# Patient Record
Sex: FEMALE | Race: BLACK OR AFRICAN AMERICAN | NOT HISPANIC OR LATINO | URBAN - METROPOLITAN AREA
[De-identification: names, ages, dates, MRNs, and addresses within clinical notes are randomized per-mention and may not be internally consistent; named-entity substitution may affect disease eponyms.]

---

## 2023-10-20 ENCOUNTER — APPOINTMENT (RX ONLY)
Dept: URBAN - METROPOLITAN AREA CLINIC 71 | Facility: CLINIC | Age: 53
Setting detail: DERMATOLOGY
End: 2023-10-20

## 2023-10-20 DIAGNOSIS — D22 MELANOCYTIC NEVI: ICD-10-CM

## 2023-10-20 DIAGNOSIS — L81.4 OTHER MELANIN HYPERPIGMENTATION: ICD-10-CM

## 2023-10-20 DIAGNOSIS — D485 NEOPLASM OF UNCERTAIN BEHAVIOR OF SKIN: ICD-10-CM

## 2023-10-20 PROBLEM — D22.61 MELANOCYTIC NEVI OF RIGHT UPPER LIMB, INCLUDING SHOULDER: Status: ACTIVE | Noted: 2023-10-20

## 2023-10-20 PROBLEM — D48.5 NEOPLASM OF UNCERTAIN BEHAVIOR OF SKIN: Status: ACTIVE | Noted: 2023-10-20

## 2023-10-20 PROBLEM — D22.5 MELANOCYTIC NEVI OF TRUNK: Status: ACTIVE | Noted: 2023-10-20

## 2023-10-20 PROCEDURE — 11102 TANGNTL BX SKIN SINGLE LES: CPT

## 2023-10-20 PROCEDURE — ? COUNSELING

## 2023-10-20 PROCEDURE — ? FULL BODY SKIN EXAM - DECLINED

## 2023-10-20 PROCEDURE — ? DEFER

## 2023-10-20 PROCEDURE — 99203 OFFICE O/P NEW LOW 30 MIN: CPT | Mod: 25

## 2023-10-20 PROCEDURE — ? BIOPSY BY SHAVE METHOD

## 2023-10-20 ASSESSMENT — LOCATION ZONE DERM
LOCATION ZONE: ARM
LOCATION ZONE: TRUNK

## 2023-10-20 ASSESSMENT — LOCATION DETAILED DESCRIPTION DERM
LOCATION DETAILED: RIGHT DISTAL DORSAL FOREARM
LOCATION DETAILED: MIDDLE STERNUM
LOCATION DETAILED: LOWER STERNUM
LOCATION DETAILED: RIGHT PROXIMAL POSTERIOR UPPER ARM
LOCATION DETAILED: LEFT VENTRAL DISTAL FOREARM
LOCATION DETAILED: LEFT PROXIMAL DORSAL FOREARM

## 2023-10-20 ASSESSMENT — LOCATION SIMPLE DESCRIPTION DERM
LOCATION SIMPLE: RIGHT POSTERIOR UPPER ARM
LOCATION SIMPLE: RIGHT FOREARM
LOCATION SIMPLE: LEFT FOREARM
LOCATION SIMPLE: CHEST

## 2023-10-20 NOTE — PROCEDURE: MIPS QUALITY
Quality 226: Preventive Care And Screening: Tobacco Use: Screening And Cessation Intervention: Patient screened for tobacco use and is an ex/non-smoker
Quality 431: Preventive Care And Screening: Unhealthy Alcohol Use - Screening: Patient not screened for unhealthy alcohol use using a systematic screening method
Detail Level: Detailed
Quality 130: Documentation Of Current Medications In The Medical Record: Current Medications Documented
Xray Chest 1 View AP/PA

## 2023-10-20 NOTE — PROCEDURE: DEFER
X Size Of Lesion In Cm (Optional): 0
Instructions (Optional): Needs to schedule with  or Kina for excision
Introduction Text (Please End With A Colon): The following procedure was deferred:
Detail Level: Detailed
Procedure To Be Performed At Next Visit: Excision